# Patient Record
Sex: MALE | Race: WHITE | NOT HISPANIC OR LATINO
[De-identification: names, ages, dates, MRNs, and addresses within clinical notes are randomized per-mention and may not be internally consistent; named-entity substitution may affect disease eponyms.]

---

## 2015-05-22 RX ORDER — METOPROLOL TARTRATE 50 MG
1 TABLET ORAL
Qty: 0 | Refills: 0 | COMMUNITY
Start: 2015-05-22

## 2015-05-22 RX ORDER — ATORVASTATIN CALCIUM 80 MG/1
1 TABLET, FILM COATED ORAL
Qty: 0 | Refills: 0 | COMMUNITY
Start: 2015-05-22

## 2019-03-28 ENCOUNTER — TRANSCRIPTION ENCOUNTER (OUTPATIENT)
Age: 64
End: 2019-03-28

## 2019-03-28 ENCOUNTER — INPATIENT (INPATIENT)
Facility: HOSPITAL | Age: 64
LOS: 0 days | Discharge: HOME | DRG: 247 | End: 2019-03-29
Attending: INTERNAL MEDICINE | Admitting: INTERNAL MEDICINE
Payer: MEDICARE

## 2019-03-28 VITALS
HEART RATE: 57 BPM | OXYGEN SATURATION: 96 % | WEIGHT: 220.02 LBS | DIASTOLIC BLOOD PRESSURE: 100 MMHG | TEMPERATURE: 98 F | HEIGHT: 67 IN | SYSTOLIC BLOOD PRESSURE: 157 MMHG | RESPIRATION RATE: 16 BRPM

## 2019-03-28 DIAGNOSIS — Z98.1 ARTHRODESIS STATUS: Chronic | ICD-10-CM

## 2019-03-28 DIAGNOSIS — Z98.89 OTHER SPECIFIED POSTPROCEDURAL STATES: Chronic | ICD-10-CM

## 2019-03-28 DIAGNOSIS — I25.10 ATHEROSCLEROTIC HEART DISEASE OF NATIVE CORONARY ARTERY WITHOUT ANGINA PECTORIS: ICD-10-CM

## 2019-03-28 DIAGNOSIS — Z95.820 PERIPHERAL VASCULAR ANGIOPLASTY STATUS WITH IMPLANTS AND GRAFTS: Chronic | ICD-10-CM

## 2019-03-28 DIAGNOSIS — R94.31 ABNORMAL ELECTROCARDIOGRAM [ECG] [EKG]: ICD-10-CM

## 2019-03-28 DIAGNOSIS — Z87.19 PERSONAL HISTORY OF OTHER DISEASES OF THE DIGESTIVE SYSTEM: Chronic | ICD-10-CM

## 2019-03-28 LAB
ALBUMIN SERPL ELPH-MCNC: 4.2 G/DL — SIGNIFICANT CHANGE UP (ref 3.3–5)
ALP SERPL-CCNC: 46 U/L — SIGNIFICANT CHANGE UP (ref 40–120)
ALT FLD-CCNC: 15 U/L — SIGNIFICANT CHANGE UP (ref 10–45)
ANION GAP SERPL CALC-SCNC: 8 MMOL/L — SIGNIFICANT CHANGE UP (ref 5–17)
AST SERPL-CCNC: 16 U/L — SIGNIFICANT CHANGE UP (ref 10–40)
BILIRUB SERPL-MCNC: 0.8 MG/DL — SIGNIFICANT CHANGE UP (ref 0.2–1.2)
BUN SERPL-MCNC: 14 MG/DL — SIGNIFICANT CHANGE UP (ref 7–23)
CALCIUM SERPL-MCNC: 9.1 MG/DL — SIGNIFICANT CHANGE UP (ref 8.4–10.5)
CHLORIDE SERPL-SCNC: 104 MMOL/L — SIGNIFICANT CHANGE UP (ref 96–108)
CO2 SERPL-SCNC: 28 MMOL/L — SIGNIFICANT CHANGE UP (ref 22–31)
CREAT SERPL-MCNC: 1.01 MG/DL — SIGNIFICANT CHANGE UP (ref 0.5–1.3)
GLUCOSE SERPL-MCNC: 114 MG/DL — HIGH (ref 70–99)
HCT VFR BLD CALC: 44.1 % — SIGNIFICANT CHANGE UP (ref 39–50)
HGB BLD-MCNC: 14.8 G/DL — SIGNIFICANT CHANGE UP (ref 13–17)
MCHC RBC-ENTMCNC: 29.7 PG — SIGNIFICANT CHANGE UP (ref 27–34)
MCHC RBC-ENTMCNC: 33.5 GM/DL — SIGNIFICANT CHANGE UP (ref 32–36)
MCV RBC AUTO: 88.7 FL — SIGNIFICANT CHANGE UP (ref 80–100)
PLATELET # BLD AUTO: 164 K/UL — SIGNIFICANT CHANGE UP (ref 150–400)
POTASSIUM SERPL-MCNC: 4.2 MMOL/L — SIGNIFICANT CHANGE UP (ref 3.5–5.3)
POTASSIUM SERPL-SCNC: 4.2 MMOL/L — SIGNIFICANT CHANGE UP (ref 3.5–5.3)
PROT SERPL-MCNC: 6.5 G/DL — SIGNIFICANT CHANGE UP (ref 6–8.3)
RBC # BLD: 4.97 M/UL — SIGNIFICANT CHANGE UP (ref 4.2–5.8)
RBC # FLD: 12.6 % — SIGNIFICANT CHANGE UP (ref 10.3–14.5)
SODIUM SERPL-SCNC: 140 MMOL/L — SIGNIFICANT CHANGE UP (ref 135–145)
WBC # BLD: 6 K/UL — SIGNIFICANT CHANGE UP (ref 3.8–10.5)
WBC # FLD AUTO: 6 K/UL — SIGNIFICANT CHANGE UP (ref 3.8–10.5)

## 2019-03-28 PROCEDURE — 92928 PRQ TCAT PLMT NTRAC ST 1 LES: CPT | Mod: LC,GC

## 2019-03-28 PROCEDURE — 93458 L HRT ARTERY/VENTRICLE ANGIO: CPT | Mod: 26,59,GC

## 2019-03-28 PROCEDURE — 99152 MOD SED SAME PHYS/QHP 5/>YRS: CPT | Mod: GC

## 2019-03-28 PROCEDURE — 93571 IV DOP VEL&/PRESS C FLO 1ST: CPT | Mod: 26,LD,GC

## 2019-03-28 PROCEDURE — 93010 ELECTROCARDIOGRAM REPORT: CPT | Mod: 76

## 2019-03-28 RX ORDER — FOLIC ACID 0.8 MG
1 TABLET ORAL DAILY
Qty: 0 | Refills: 0 | Status: DISCONTINUED | OUTPATIENT
Start: 2019-03-28 | End: 2019-03-29

## 2019-03-28 RX ORDER — CLOPIDOGREL BISULFATE 75 MG/1
75 TABLET, FILM COATED ORAL DAILY
Qty: 0 | Refills: 0 | Status: DISCONTINUED | OUTPATIENT
Start: 2019-03-28 | End: 2019-03-29

## 2019-03-28 RX ORDER — TERAZOSIN HYDROCHLORIDE 10 MG/1
1 CAPSULE ORAL
Qty: 0 | Refills: 0 | COMMUNITY

## 2019-03-28 RX ORDER — HYDROMORPHONE HYDROCHLORIDE 2 MG/ML
1 INJECTION INTRAMUSCULAR; INTRAVENOUS; SUBCUTANEOUS
Qty: 0 | Refills: 0 | COMMUNITY

## 2019-03-28 RX ORDER — FOLIC ACID 0.8 MG
1 TABLET ORAL
Qty: 0 | Refills: 0 | COMMUNITY

## 2019-03-28 RX ORDER — LATANOPROST 0.05 MG/ML
1 SOLUTION/ DROPS OPHTHALMIC; TOPICAL AT BEDTIME
Qty: 0 | Refills: 0 | Status: DISCONTINUED | OUTPATIENT
Start: 2019-03-28 | End: 2019-03-29

## 2019-03-28 RX ORDER — DOXAZOSIN MESYLATE 4 MG
4 TABLET ORAL AT BEDTIME
Qty: 0 | Refills: 0 | Status: DISCONTINUED | OUTPATIENT
Start: 2019-03-28 | End: 2019-03-29

## 2019-03-28 RX ORDER — PANTOPRAZOLE SODIUM 20 MG/1
40 TABLET, DELAYED RELEASE ORAL
Qty: 0 | Refills: 0 | Status: DISCONTINUED | OUTPATIENT
Start: 2019-03-28 | End: 2019-03-29

## 2019-03-28 RX ORDER — RANOLAZINE 500 MG/1
1 TABLET, FILM COATED, EXTENDED RELEASE ORAL
Qty: 0 | Refills: 0 | COMMUNITY

## 2019-03-28 RX ORDER — HYDROMORPHONE HYDROCHLORIDE 2 MG/ML
4 INJECTION INTRAMUSCULAR; INTRAVENOUS; SUBCUTANEOUS AT BEDTIME
Qty: 0 | Refills: 0 | Status: DISCONTINUED | OUTPATIENT
Start: 2019-03-28 | End: 2019-03-29

## 2019-03-28 RX ORDER — ATORVASTATIN CALCIUM 80 MG/1
80 TABLET, FILM COATED ORAL AT BEDTIME
Qty: 0 | Refills: 0 | Status: DISCONTINUED | OUTPATIENT
Start: 2019-03-28 | End: 2019-03-29

## 2019-03-28 RX ORDER — CLOPIDOGREL BISULFATE 75 MG/1
1 TABLET, FILM COATED ORAL
Qty: 90 | Refills: 3 | OUTPATIENT
Start: 2019-03-28 | End: 2020-03-21

## 2019-03-28 RX ORDER — GABAPENTIN 400 MG/1
100 CAPSULE ORAL THREE TIMES A DAY
Qty: 0 | Refills: 0 | Status: DISCONTINUED | OUTPATIENT
Start: 2019-03-28 | End: 2019-03-29

## 2019-03-28 RX ORDER — HYDROMORPHONE HYDROCHLORIDE 2 MG/ML
4 INJECTION INTRAMUSCULAR; INTRAVENOUS; SUBCUTANEOUS EVERY 8 HOURS
Qty: 0 | Refills: 0 | Status: DISCONTINUED | OUTPATIENT
Start: 2019-03-28 | End: 2019-03-29

## 2019-03-28 RX ORDER — METOPROLOL TARTRATE 50 MG
25 TABLET ORAL DAILY
Qty: 0 | Refills: 0 | Status: DISCONTINUED | OUTPATIENT
Start: 2019-03-28 | End: 2019-03-29

## 2019-03-28 RX ORDER — RANITIDINE HYDROCHLORIDE 150 MG/1
1 TABLET, FILM COATED ORAL
Qty: 0 | Refills: 0 | COMMUNITY

## 2019-03-28 RX ORDER — RANOLAZINE 500 MG/1
500 TABLET, FILM COATED, EXTENDED RELEASE ORAL
Qty: 0 | Refills: 0 | Status: DISCONTINUED | OUTPATIENT
Start: 2019-03-28 | End: 2019-03-29

## 2019-03-28 RX ORDER — GABAPENTIN 400 MG/1
1 CAPSULE ORAL
Qty: 0 | Refills: 0 | COMMUNITY

## 2019-03-28 RX ORDER — ASPIRIN/CALCIUM CARB/MAGNESIUM 324 MG
81 TABLET ORAL DAILY
Qty: 0 | Refills: 0 | Status: DISCONTINUED | OUTPATIENT
Start: 2019-03-28 | End: 2019-03-29

## 2019-03-28 RX ORDER — FAMOTIDINE 10 MG/ML
20 INJECTION INTRAVENOUS AT BEDTIME
Qty: 0 | Refills: 0 | Status: DISCONTINUED | OUTPATIENT
Start: 2019-03-28 | End: 2019-03-29

## 2019-03-28 RX ADMIN — HYDROMORPHONE HYDROCHLORIDE 4 MILLIGRAM(S): 2 INJECTION INTRAMUSCULAR; INTRAVENOUS; SUBCUTANEOUS at 21:34

## 2019-03-28 RX ADMIN — HYDROMORPHONE HYDROCHLORIDE 4 MILLIGRAM(S): 2 INJECTION INTRAMUSCULAR; INTRAVENOUS; SUBCUTANEOUS at 22:41

## 2019-03-28 RX ADMIN — ATORVASTATIN CALCIUM 80 MILLIGRAM(S): 80 TABLET, FILM COATED ORAL at 21:34

## 2019-03-28 RX ADMIN — LATANOPROST 1 DROP(S): 0.05 SOLUTION/ DROPS OPHTHALMIC; TOPICAL at 21:35

## 2019-03-28 RX ADMIN — RANOLAZINE 500 MILLIGRAM(S): 500 TABLET, FILM COATED, EXTENDED RELEASE ORAL at 17:43

## 2019-03-28 RX ADMIN — FAMOTIDINE 20 MILLIGRAM(S): 10 INJECTION INTRAVENOUS at 21:34

## 2019-03-28 RX ADMIN — GABAPENTIN 100 MILLIGRAM(S): 400 CAPSULE ORAL at 14:52

## 2019-03-28 RX ADMIN — Medication 4 MILLIGRAM(S): at 21:34

## 2019-03-28 RX ADMIN — GABAPENTIN 100 MILLIGRAM(S): 400 CAPSULE ORAL at 21:34

## 2019-03-28 NOTE — H&P CARDIOLOGY - PSH
H/O hernia repair  (R) inguinal hernia repair  x3 last was approximately  2007  H/O shoulder surgery  1992  Lumbar disc fusion  L2L3  2009  ORIF right leg  1983

## 2019-03-28 NOTE — DISCHARGE NOTE PROVIDER - CARE PROVIDER_API CALL
Ariana Espinoza)  Internal Medicine  [address] [place] [address]  Phone: [phone]  Fax: [phone]  Follow Up Time:

## 2019-03-28 NOTE — DISCHARGE NOTE PROVIDER - HOSPITAL COURSE
HPI:    64 y/o obese  male (BMI 34) former smoker (15 pyh) with pmh of HTN, HLD, CAD s/p PCI/ANT OM3 5/21/15, Cervical spine and lumbar spinal surgery c/b Chronic pain on (hydromorphone), BPH, GERD followed by Dr. Lopez, Cardiologist reports having chest burning on exertion for the past 6 months treated on Ranitidine and Protonix worsening over the past few weeks unable to tolerate Nitro patch s/p NST revealing  a small miled infero-lateral reversible defect consistent with ischemia which can be due to OM stent stenosis LVEF 61% (3/12/19).  Pt presents for cardiac cath for further evaluation of his CAD based on his symptoms and abnormal Stress test.  Pt denies chest burning or pain, palpitations or SOB.  Pt does have difficulty sitting still 2/2 his chronic back pain.          PCP - Dr. Espinoza (28 Mar 2019 08:04)        3/28 cardiac cath with one stent to the mid LAD. Right radial site without swelling, bleeding.

## 2019-03-28 NOTE — DISCHARGE NOTE PROVIDER - NSDCCPCAREPLAN_GEN_ALL_CORE_FT
PRINCIPAL DISCHARGE DIAGNOSIS  Diagnosis: CAD (coronary artery disease), native coronary artery  Assessment and Plan of Treatment: Do not stop your aspirin or Plavix unless instructed to do so by your cardiologist, they help keep your stented arteries open. No heavy lifting or pushing/pulling with procedure arm for 2 weeks. No driving for 2 days. You may shower 24 hours following the procedure but avoid baths/swimming for 1 week. Check your wrist site for bleeding and/or swelling daily following procedure and call your doctor immediately if it occurs or if you experience increased pain at the site. Follow up with your cardiologist in 1-2 weeks. You may call [facility] if you have any questions/concerns regarding your procedure [phone].      SECONDARY DISCHARGE DIAGNOSES  Diagnosis: HLD (hyperlipidemia)  Assessment and Plan of Treatment: Continue with your cholesterol medications. Eat a heart healthy diet that is low in saturated fats and salt, and includes whole grains, fruits, vegetables and lean protein; exercise regularly (consult with your physician or cardiologist first); maintain a heart healthy weight; if you smoke - quit (A resource to help you stop smoking is the [facility] for Tobacco Control – phone number [phone].). Continue to follow with your primary physician or cardiologist.    Diagnosis: HTN (hypertension)  Assessment and Plan of Treatment: Continue with your blood pressure medications; eat a heart healthy diet with low salt diet; exercise regularly (consult with your physician or cardiologist first); maintain a heart healthy weight; if you smoke - quit (A resource to help you stop smoking is the [facility] [employer] for Tobacco Control – phone number [phone].); include healthy ways to manage stress. Continue to follow with your primary care physician or cardiologist.

## 2019-03-28 NOTE — DISCHARGE NOTE PROVIDER - NSDCCPTREATMENT_GEN_ALL_CORE_FT
PRINCIPAL PROCEDURE  Procedure: Left heart cardiac cath  Findings and Treatment: one stent to the mid LAD

## 2019-03-28 NOTE — DISCHARGE NOTE PROVIDER - NSDCPNSUBOBJ_GEN_ALL_CORE
Patient is a 63y old  Male who presents with a chief complaint of abnormal stress test (28 Mar 2019 21:34)                    Allergies        No Known Allergies        Intolerances                Medications:    aspirin enteric coated 81 milliGRAM(s) Oral daily    atorvastatin 80 milliGRAM(s) Oral at bedtime    clopidogrel Tablet 75 milliGRAM(s) Oral daily    doxazosin 4 milliGRAM(s) Oral at bedtime    famotidine    Tablet 20 milliGRAM(s) Oral at bedtime    folic acid 1 milliGRAM(s) Oral daily    gabapentin 100 milliGRAM(s) Oral three times a day    HYDROmorphone   Tablet 4 milliGRAM(s) Oral every 8 hours PRN    HYDROmorphone   Tablet 4 milliGRAM(s) Oral at bedtime PRN    latanoprost 0.005% Ophthalmic Solution 1 Drop(s) Both EYES at bedtime    metoprolol succinate ER 25 milliGRAM(s) Oral daily    pantoprazole    Tablet 40 milliGRAM(s) Oral before breakfast    ranolazine 500 milliGRAM(s) Oral two times a day            Vitals:    T(C): 36.8 (19 @ 21:01), Max: 36.9 (19 @ 12:25)    HR: 53 (19 @ 21:01) (44 - 57)    BP: 143/72 (19 @ 21:01) (111/81 - 160/83)    BP(mean): 119 (19 @ 08:04) (119 - 119)    RR: 16 (19 @ 21:01) (16 - 18)    SpO2: 98% (19 @ 21:01) (96% - 98%)    Wt(kg): --    Daily Height in cm: 170.18 (28 Mar 2019 08:04)      Daily Weight in k.8 (28 Mar 2019 08:04)    I&O's Summary                Physical Exam:    Appearance: Normal    Eyes: PERRL, EOMI    HENT: Normal oral muscosa, NC/AT    Cardiovascular: S1S2, RRR, No M/R/G, no JVD, No Lower extremity edema    Procedural Access Site: No hematoma, Non-tender to palpation, 2+ pulse, No bruit, No Ecchymosis    Respiratory: Clear to auscultation bilaterally    Gastrointestinal: Soft, Non tender, Normal Bowel Sounds    Musculoskeletal: No clubbing, No joint deformity     Neurologic: Non-focal    Lymphatic: No lymphadenopathy    Psychiatry: AAOx3, Mood & affect appropriate    Skin: No rashes, No ecchymoses, No cyanosis                141  |  104  |  15    ----------------------------<  93    3.9   |  27  |  1.00        Ca    9.0      29 Mar 2019 02:08        TPro  6.4  /  Alb  3.9  /  TBili  1.0  /  DBili  x   /  AST  16  /  ALT  19  /  AlkPhos  42                          Lipid panel     Hgb A1c                             14.5     7.2   )-----------( 157      ( 29 Mar 2019 02:08 )               43.5                 ECG: SB 56 bpm        Cath: one mid LAD stent        Imaging:        Interpretation of Telemetry:

## 2019-03-28 NOTE — H&P CARDIOLOGY - HISTORY OF PRESENT ILLNESS
62 y/o obese  male (BMI 34) former smoker (15 pyh) with pmh of HTN, HLD, CAD s/p PCI/ANT OM3 5/21/15, Cervical spine and lumbar spinal surgery c/b Chronic pain on (hydromorphone), BPH, GERD followed by Dr. Lopez, Cardiologist reports having chest burning on exertion for the past 6 months treated on Ranitidine and Protonix worsening over the past few weeks unable to tolerate Nitro patch s/p NST revealing  a small miled infero-lateral reversible defect consistent with ischemia which can be due to OM stent stenosis LVEF 61% (3/12/19).  Pt presents for cardiac cath for further evaluation of his CAD based on his symptoms and abnormal Stress test.  Pt denies chest burning or pain, palpitations or SOB.  Pt does have difficulty sitting still 2/2 his chronic back pain.      PCP - Dr. Espinoza

## 2019-03-28 NOTE — CHART NOTE - NSCHARTNOTEFT_GEN_A_CORE
Patient underwent a PCI procedure and is being admitted as they are at increased risk for major adverse cardiac and vascular events if discharged due to the following high risk characteristics:      Unstable angina    Marlon Elias, NP  x [phone]

## 2019-03-29 ENCOUNTER — TRANSCRIPTION ENCOUNTER (OUTPATIENT)
Age: 64
End: 2019-03-29

## 2019-03-29 VITALS
DIASTOLIC BLOOD PRESSURE: 86 MMHG | OXYGEN SATURATION: 97 % | RESPIRATION RATE: 17 BRPM | HEART RATE: 63 BPM | TEMPERATURE: 98 F | SYSTOLIC BLOOD PRESSURE: 155 MMHG

## 2019-03-29 DIAGNOSIS — I25.118 ATHEROSCLEROTIC HEART DISEASE OF NATIVE CORONARY ARTERY WITH OTHER FORMS OF ANGINA PECTORIS: ICD-10-CM

## 2019-03-29 DIAGNOSIS — I10 ESSENTIAL (PRIMARY) HYPERTENSION: ICD-10-CM

## 2019-03-29 DIAGNOSIS — E78.5 HYPERLIPIDEMIA, UNSPECIFIED: ICD-10-CM

## 2019-03-29 LAB
ALBUMIN SERPL ELPH-MCNC: 3.9 G/DL — SIGNIFICANT CHANGE UP (ref 3.3–5)
ALP SERPL-CCNC: 42 U/L — SIGNIFICANT CHANGE UP (ref 40–120)
ALT FLD-CCNC: 19 U/L — SIGNIFICANT CHANGE UP (ref 10–45)
ANION GAP SERPL CALC-SCNC: 10 MMOL/L — SIGNIFICANT CHANGE UP (ref 5–17)
AST SERPL-CCNC: 16 U/L — SIGNIFICANT CHANGE UP (ref 10–40)
BASOPHILS # BLD AUTO: 0 K/UL — SIGNIFICANT CHANGE UP (ref 0–0.2)
BASOPHILS NFR BLD AUTO: 0.6 % — SIGNIFICANT CHANGE UP (ref 0–2)
BILIRUB SERPL-MCNC: 1 MG/DL — SIGNIFICANT CHANGE UP (ref 0.2–1.2)
BUN SERPL-MCNC: 15 MG/DL — SIGNIFICANT CHANGE UP (ref 7–23)
CALCIUM SERPL-MCNC: 9 MG/DL — SIGNIFICANT CHANGE UP (ref 8.4–10.5)
CHLORIDE SERPL-SCNC: 104 MMOL/L — SIGNIFICANT CHANGE UP (ref 96–108)
CO2 SERPL-SCNC: 27 MMOL/L — SIGNIFICANT CHANGE UP (ref 22–31)
CREAT SERPL-MCNC: 1 MG/DL — SIGNIFICANT CHANGE UP (ref 0.5–1.3)
EOSINOPHIL # BLD AUTO: 0.2 K/UL — SIGNIFICANT CHANGE UP (ref 0–0.5)
EOSINOPHIL NFR BLD AUTO: 2.8 % — SIGNIFICANT CHANGE UP (ref 0–6)
GLUCOSE SERPL-MCNC: 93 MG/DL — SIGNIFICANT CHANGE UP (ref 70–99)
HCT VFR BLD CALC: 43.5 % — SIGNIFICANT CHANGE UP (ref 39–50)
HGB BLD-MCNC: 14.5 G/DL — SIGNIFICANT CHANGE UP (ref 13–17)
LYMPHOCYTES # BLD AUTO: 1.8 K/UL — SIGNIFICANT CHANGE UP (ref 1–3.3)
LYMPHOCYTES # BLD AUTO: 25.1 % — SIGNIFICANT CHANGE UP (ref 13–44)
MCHC RBC-ENTMCNC: 29.5 PG — SIGNIFICANT CHANGE UP (ref 27–34)
MCHC RBC-ENTMCNC: 33.3 GM/DL — SIGNIFICANT CHANGE UP (ref 32–36)
MCV RBC AUTO: 88.4 FL — SIGNIFICANT CHANGE UP (ref 80–100)
MONOCYTES # BLD AUTO: 0.6 K/UL — SIGNIFICANT CHANGE UP (ref 0–0.9)
MONOCYTES NFR BLD AUTO: 8 % — SIGNIFICANT CHANGE UP (ref 2–14)
NEUTROPHILS # BLD AUTO: 4.6 K/UL — SIGNIFICANT CHANGE UP (ref 1.8–7.4)
NEUTROPHILS NFR BLD AUTO: 63.5 % — SIGNIFICANT CHANGE UP (ref 43–77)
PLATELET # BLD AUTO: 157 K/UL — SIGNIFICANT CHANGE UP (ref 150–400)
POTASSIUM SERPL-MCNC: 3.9 MMOL/L — SIGNIFICANT CHANGE UP (ref 3.5–5.3)
POTASSIUM SERPL-SCNC: 3.9 MMOL/L — SIGNIFICANT CHANGE UP (ref 3.5–5.3)
PROT SERPL-MCNC: 6.4 G/DL — SIGNIFICANT CHANGE UP (ref 6–8.3)
RBC # BLD: 4.92 M/UL — SIGNIFICANT CHANGE UP (ref 4.2–5.8)
RBC # FLD: 12.4 % — SIGNIFICANT CHANGE UP (ref 10.3–14.5)
SODIUM SERPL-SCNC: 141 MMOL/L — SIGNIFICANT CHANGE UP (ref 135–145)
WBC # BLD: 7.2 K/UL — SIGNIFICANT CHANGE UP (ref 3.8–10.5)
WBC # FLD AUTO: 7.2 K/UL — SIGNIFICANT CHANGE UP (ref 3.8–10.5)

## 2019-03-29 PROCEDURE — 99231 SBSQ HOSP IP/OBS SF/LOW 25: CPT

## 2019-03-29 PROCEDURE — 99152 MOD SED SAME PHYS/QHP 5/>YRS: CPT

## 2019-03-29 PROCEDURE — 99153 MOD SED SAME PHYS/QHP EA: CPT

## 2019-03-29 PROCEDURE — C1874: CPT

## 2019-03-29 PROCEDURE — 93458 L HRT ARTERY/VENTRICLE ANGIO: CPT | Mod: 59

## 2019-03-29 PROCEDURE — C1894: CPT

## 2019-03-29 PROCEDURE — C1725: CPT

## 2019-03-29 PROCEDURE — 93005 ELECTROCARDIOGRAM TRACING: CPT

## 2019-03-29 PROCEDURE — 80053 COMPREHEN METABOLIC PANEL: CPT

## 2019-03-29 PROCEDURE — 85027 COMPLETE CBC AUTOMATED: CPT

## 2019-03-29 PROCEDURE — C1887: CPT

## 2019-03-29 PROCEDURE — C9600: CPT | Mod: LD

## 2019-03-29 PROCEDURE — 93571 IV DOP VEL&/PRESS C FLO 1ST: CPT | Mod: LD

## 2019-03-29 PROCEDURE — C1769: CPT

## 2019-03-29 RX ORDER — PANTOPRAZOLE SODIUM 20 MG/1
1 TABLET, DELAYED RELEASE ORAL
Qty: 30 | Refills: 0 | OUTPATIENT
Start: 2019-03-29 | End: 2019-04-27

## 2019-03-29 RX ADMIN — GABAPENTIN 100 MILLIGRAM(S): 400 CAPSULE ORAL at 06:05

## 2019-03-29 RX ADMIN — CLOPIDOGREL BISULFATE 75 MILLIGRAM(S): 75 TABLET, FILM COATED ORAL at 06:05

## 2019-03-29 RX ADMIN — Medication 81 MILLIGRAM(S): at 06:05

## 2019-03-29 RX ADMIN — Medication 25 MILLIGRAM(S): at 06:05

## 2019-03-29 RX ADMIN — RANOLAZINE 500 MILLIGRAM(S): 500 TABLET, FILM COATED, EXTENDED RELEASE ORAL at 06:04

## 2019-03-29 RX ADMIN — PANTOPRAZOLE SODIUM 40 MILLIGRAM(S): 20 TABLET, DELAYED RELEASE ORAL at 06:05

## 2019-03-29 NOTE — PROGRESS NOTE ADULT - ASSESSMENT
HPI:  62 y/o obese  male (BMI 34) former smoker (15 pyh) with pmh of HTN, HLD, CAD s/p PCI/ANT OM3 5/21/15, Cervical spine and lumbar spinal surgery c/b Chronic pain on (hydromorphone), BPH, GERD followed by Dr. Lopez, Cardiologist reports having chest burning on exertion for the past 6 months treated on Ranitidine and Protonix worsening over the past few weeks unable to tolerate Nitro patch s/p NST revealing  a small miled infero-lateral reversible defect consistent with ischemia which can be due to OM stent stenosis LVEF 61% (3/12/19).  Pt presents for cardiac cath for further evaluation of his CAD based on his symptoms and abnormal Stress test.  Pt denies chest burning or pain, palpitations or SOB.  Pt does have difficulty sitting still 2/2 his chronic back pain.      PCP - Dr. Espinoza (28 Mar 2019 08:04)

## 2019-03-29 NOTE — DISCHARGE NOTE NURSING/CASE MANAGEMENT/SOCIAL WORK - NSDCDPATPORTLINK_GEN_ALL_CORE
You can access the [facility] Patient Portal, offered by [facility], by registering with the following website: [email or web address]

## 2019-03-29 NOTE — PROGRESS NOTE ADULT - SUBJECTIVE AND OBJECTIVE BOX
Patient is a 63y old  Male who presents with a chief complaint of abnormal stress test (28 Mar 2019 21:34)          Allergies    No Known Allergies    Intolerances        Medications:  aspirin enteric coated 81 milliGRAM(s) Oral daily  atorvastatin 80 milliGRAM(s) Oral at bedtime  clopidogrel Tablet 75 milliGRAM(s) Oral daily  doxazosin 4 milliGRAM(s) Oral at bedtime  famotidine    Tablet 20 milliGRAM(s) Oral at bedtime  folic acid 1 milliGRAM(s) Oral daily  gabapentin 100 milliGRAM(s) Oral three times a day  HYDROmorphone   Tablet 4 milliGRAM(s) Oral every 8 hours PRN  HYDROmorphone   Tablet 4 milliGRAM(s) Oral at bedtime PRN  latanoprost 0.005% Ophthalmic Solution 1 Drop(s) Both EYES at bedtime  metoprolol succinate ER 25 milliGRAM(s) Oral daily  pantoprazole    Tablet 40 milliGRAM(s) Oral before breakfast  ranolazine 500 milliGRAM(s) Oral two times a day      Vitals:  T(C): 36.8 (19 @ 21:01), Max: 36.9 (19 @ 12:25)  HR: 53 (19 @ 21:01) (44 - 57)  BP: 143/72 (19 @ 21:01) (111/81 - 160/83)  BP(mean): 119 (19 @ 08:04) (119 - 119)  RR: 16 (19 @ 21:01) (16 - 18)  SpO2: 98% (19 @ 21:01) (96% - 98%)  Wt(kg): --  Daily Height in cm: 170.18 (28 Mar 2019 08:04)    Daily Weight in k.8 (28 Mar 2019 08:04)  I&O's Summary        Physical Exam:  Appearance: Normal  Eyes: PERRL, EOMI  HENT: Normal oral muscosa, NC/AT  Cardiovascular: S1S2, RRR, No M/R/G, no JVD, No Lower extremity edema  Procedural Access Site: No hematoma, Non-tender to palpation, 2+ pulse, No bruit, No Ecchymosis  Respiratory: Clear to auscultation bilaterally  Gastrointestinal: Soft, Non tender, Normal Bowel Sounds  Musculoskeletal: No clubbing, No joint deformity   Neurologic: Non-focal  Lymphatic: No lymphadenopathy  Psychiatry: AAOx3, Mood & affect appropriate  Skin: No rashes, No ecchymoses, No cyanosis        141  |  104  |  15  ----------------------------<  93  3.9   |  27  |  1.00    Ca    9.0      29 Mar 2019 02:08    TPro  6.4  /  Alb  3.9  /  TBili  1.0  /  DBili  x   /  AST  16  /  ALT  19  /  AlkPhos  42              Lipid panel   Hgb A1c                         14.5   7.2   )-----------( 157      ( 29 Mar 2019 02:08 )             43.5         ECG: SB 53 bpm    Cath: mid LAD x1 stent    Imaging:    Interpretation of Telemetry:

## 2022-08-09 PROBLEM — I25.10 ATHEROSCLEROTIC HEART DISEASE OF NATIVE CORONARY ARTERY WITHOUT ANGINA PECTORIS: Chronic | Status: ACTIVE | Noted: 2019-03-28

## 2022-08-11 ENCOUNTER — APPOINTMENT (OUTPATIENT)
Dept: SPINE | Facility: CLINIC | Age: 67
End: 2022-08-11

## 2022-08-11 VITALS
WEIGHT: 207 LBS | SYSTOLIC BLOOD PRESSURE: 101 MMHG | DIASTOLIC BLOOD PRESSURE: 69 MMHG | HEART RATE: 68 BPM | OXYGEN SATURATION: 96 % | RESPIRATION RATE: 16 BRPM | HEIGHT: 67 IN | BODY MASS INDEX: 32.49 KG/M2 | TEMPERATURE: 97.7 F

## 2022-08-11 PROCEDURE — 99203 OFFICE O/P NEW LOW 30 MIN: CPT

## 2022-08-11 NOTE — HISTORY OF PRESENT ILLNESS
[Other: ___] : [unfilled] [FreeTextEntry1] : The patient complains of mid and lower  back pain with radiation to his left lateral and anterior calf region [de-identified] : ZIYAD DUNCAN is a 67 year old gentleman who underwent a C4-C7 anterior cervical discectomy and fusion on 04/26/2012 with Dr. Greenberg.  He also underwent an L2-L3 lumbar fusion at the [facility] by another provider in 2009.  The patient had seen Dr. Greenberg in 2013 with complaints of pain radiating into his groin and had an MRI of the thoracic spine which revealed stenosis on the right side at T10-T11 and T11-T12.  It was recommended that the patient try conservative measures of pain management.  He stated that he has been experiencing mid and lower back pain with radiation mostly into his lateral thigh and anterior calf for over a year.  He did receive physical therapy and acupuncture a year ago with little relief.  He has not received any epidural injections.  The patient stated his pain is 6-7 on a scale from 0-10 and bothers him every day.  The pain is worse at night.  He also suffers pain from a right leg fracture which occurred years ago.  The patient denies any difficulty with bowel or bladder except for urinary frequency due to BPH.

## 2022-08-11 NOTE — ASSESSMENT
[FreeTextEntry1] : it was recommended that the patient start physical therapy to help to relieve his mid and lower back and left leg pain.  He was also provided with a referral to pain management specialist, Dr. Attila Gerardo  for consideration of lumbar epidural injections.  The patient is to have thoracic AP and lateral and lumbar AP and lateral x rays with flexion and extension views.  It was also advised that he have MRIs of the thoracic and lumbar spine for evaluation of thoracic and lumbar stenosis.  He is to return to the office with the images for Dr. Scott Greenberg to review.

## 2022-08-11 NOTE — PHYSICAL EXAM
[General Appearance - Alert] : alert [General Appearance - In No Acute Distress] : in no acute distress [General Appearance - Well Nourished] : well nourished [General Appearance - Well Developed] : well developed [] : normal voice and communication [General Appearance - Well-Appearing] : healthy appearing [Person] : oriented to person [Place] : oriented to place [Short Term Intact] : short term memory intact [Time] : oriented to time [Remote Intact] : remote memory intact [Span Intact] : the attention span was normal [Concentration Intact] : normal concentrating ability [Fluency] : fluency intact [Comprehension] : comprehension intact [Current Events] : adequate knowledge of current events [Past History] : adequate knowledge of personal past history [Vocabulary] : adequate range of vocabulary [Cranial Nerves Optic (II)] : visual acuity intact bilaterally,  pupils equal round and reactive to light [Cranial Nerves Oculomotor (III)] : extraocular motion intact [Cranial Nerves Trigeminal (V)] : facial sensation intact symmetrically [Cranial Nerves Facial (VII)] : face symmetrical [Cranial Nerves Vestibulocochlear (VIII)] : hearing was intact bilaterally [Cranial Nerves Glossopharyngeal (IX)] : tongue and palate midline [Cranial Nerves Accessory (XI - Cranial And Spinal)] : head turning and shoulder shrug symmetric [Cranial Nerves Hypoglossal (XII)] : there was no tongue deviation with protrusion [Motor Tone] : muscle tone was normal in all four extremities [Motor Strength] : muscle strength was normal in all four extremities [No Muscle Atrophy] : normal bulk in all four extremities [4] : L2 Iliopsoas 4/5 [5] : S1 flexor hallucis longus 5/5 [Sensation Tactile Decrease] : light touch was intact [Limited Balance] : the patient's balance was impaired [0] : Ankle jerk right 0 [2+] : Ankle jerk left 2+ [Past-pointing] : there was no past-pointing [Tremor] : no tremor present [Plantar Reflex Right Only] : normal on the right [Plantar Reflex Left Only] : normal on the left [___] : absent on the right [___] : absent on the left [Duarte] : Duarte's sign was not demonstrated [FreeTextEntry6] : The patient keeps his right leg out straight due to a history of a fracture. [FreeTextEntry1] : The patient is able to hold his right leg out straight but has little motion at his knee or ankle.  His left hip flexor strength is pain limited. [FreeTextEntry8] : Walks with a limp using a cane due to history of right leg fracture.  Ambulates with a slightly bent over posture.

## 2022-08-11 NOTE — REASON FOR VISIT
[New Patient Visit] : a new patient visit [FreeTextEntry1] : The patient reports having mid and lower back pain with radiation down his left leg to his lateral and anterior left calf region.

## 2022-08-11 NOTE — REVIEW OF SYSTEMS
[Feeling Tired] : feeling tired [Leg Weakness] : leg weakness [Difficulty Walking] : difficulty walking [Limping] : limping [As Noted in HPI] : as noted in HPI [Negative] : Heme/Lymph [FreeTextEntry8] : urinary frequency due to BPH

## 2022-09-13 ENCOUNTER — OUTPATIENT (OUTPATIENT)
Dept: OUTPATIENT SERVICES | Facility: HOSPITAL | Age: 67
LOS: 1 days | End: 2022-09-13
Payer: MEDICARE

## 2022-09-13 ENCOUNTER — APPOINTMENT (OUTPATIENT)
Dept: ANESTHESIOLOGY | Facility: CLINIC | Age: 67
End: 2022-09-13

## 2022-09-13 DIAGNOSIS — M54.17 RADICULOPATHY, LUMBOSACRAL REGION: ICD-10-CM

## 2022-09-13 DIAGNOSIS — Z95.820 PERIPHERAL VASCULAR ANGIOPLASTY STATUS WITH IMPLANTS AND GRAFTS: Chronic | ICD-10-CM

## 2022-09-13 DIAGNOSIS — Z98.89 OTHER SPECIFIED POSTPROCEDURAL STATES: Chronic | ICD-10-CM

## 2022-09-13 DIAGNOSIS — Z98.1 ARTHRODESIS STATUS: Chronic | ICD-10-CM

## 2022-09-13 DIAGNOSIS — Z87.19 PERSONAL HISTORY OF OTHER DISEASES OF THE DIGESTIVE SYSTEM: Chronic | ICD-10-CM

## 2022-09-13 PROCEDURE — 64483 NJX AA&/STRD TFRM EPI L/S 1: CPT

## 2022-09-14 ENCOUNTER — APPOINTMENT (OUTPATIENT)
Dept: SPINE | Facility: CLINIC | Age: 67
End: 2022-09-14

## 2022-09-14 VITALS
BODY MASS INDEX: 32.49 KG/M2 | HEIGHT: 67 IN | SYSTOLIC BLOOD PRESSURE: 130 MMHG | OXYGEN SATURATION: 97 % | DIASTOLIC BLOOD PRESSURE: 77 MMHG | HEART RATE: 53 BPM | WEIGHT: 207 LBS

## 2022-09-14 PROCEDURE — 99213 OFFICE O/P EST LOW 20 MIN: CPT

## 2022-10-07 ENCOUNTER — OUTPATIENT (OUTPATIENT)
Dept: OUTPATIENT SERVICES | Facility: HOSPITAL | Age: 67
LOS: 1 days | End: 2022-10-07
Payer: MEDICARE

## 2022-10-07 ENCOUNTER — APPOINTMENT (OUTPATIENT)
Dept: ANESTHESIOLOGY | Facility: CLINIC | Age: 67
End: 2022-10-07

## 2022-10-07 DIAGNOSIS — Z98.1 ARTHRODESIS STATUS: Chronic | ICD-10-CM

## 2022-10-07 DIAGNOSIS — M54.17 RADICULOPATHY, LUMBOSACRAL REGION: ICD-10-CM

## 2022-10-07 DIAGNOSIS — M54.16 RADICULOPATHY, LUMBAR REGION: ICD-10-CM

## 2022-10-07 DIAGNOSIS — Z87.19 PERSONAL HISTORY OF OTHER DISEASES OF THE DIGESTIVE SYSTEM: Chronic | ICD-10-CM

## 2022-10-07 DIAGNOSIS — Z95.820 PERIPHERAL VASCULAR ANGIOPLASTY STATUS WITH IMPLANTS AND GRAFTS: Chronic | ICD-10-CM

## 2022-10-07 DIAGNOSIS — Z98.89 OTHER SPECIFIED POSTPROCEDURAL STATES: Chronic | ICD-10-CM

## 2022-10-07 PROCEDURE — 62323 NJX INTERLAMINAR LMBR/SAC: CPT

## 2022-10-26 ENCOUNTER — NON-APPOINTMENT (OUTPATIENT)
Age: 67
End: 2022-10-26

## 2022-10-26 ENCOUNTER — APPOINTMENT (OUTPATIENT)
Dept: SPINE | Facility: CLINIC | Age: 67
End: 2022-10-26

## 2022-10-26 VITALS
SYSTOLIC BLOOD PRESSURE: 147 MMHG | HEIGHT: 67 IN | WEIGHT: 210 LBS | HEART RATE: 79 BPM | DIASTOLIC BLOOD PRESSURE: 83 MMHG | BODY MASS INDEX: 32.96 KG/M2 | OXYGEN SATURATION: 95 %

## 2022-10-26 PROCEDURE — 99213 OFFICE O/P EST LOW 20 MIN: CPT

## 2022-10-27 ENCOUNTER — OUTPATIENT (OUTPATIENT)
Dept: OUTPATIENT SERVICES | Facility: HOSPITAL | Age: 67
LOS: 1 days | End: 2022-10-27
Payer: MEDICARE

## 2022-10-27 ENCOUNTER — APPOINTMENT (OUTPATIENT)
Dept: RADIOLOGY | Facility: CLINIC | Age: 67
End: 2022-10-27

## 2022-10-27 DIAGNOSIS — Z98.89 OTHER SPECIFIED POSTPROCEDURAL STATES: Chronic | ICD-10-CM

## 2022-10-27 DIAGNOSIS — Z00.00 ENCOUNTER FOR GENERAL ADULT MEDICAL EXAMINATION WITHOUT ABNORMAL FINDINGS: ICD-10-CM

## 2022-10-27 DIAGNOSIS — Z98.1 ARTHRODESIS STATUS: Chronic | ICD-10-CM

## 2022-10-27 DIAGNOSIS — Z95.820 PERIPHERAL VASCULAR ANGIOPLASTY STATUS WITH IMPLANTS AND GRAFTS: Chronic | ICD-10-CM

## 2022-10-27 DIAGNOSIS — Z87.19 PERSONAL HISTORY OF OTHER DISEASES OF THE DIGESTIVE SYSTEM: Chronic | ICD-10-CM

## 2022-10-27 PROCEDURE — 72083 X-RAY EXAM ENTIRE SPI 4/5 VW: CPT | Mod: 26

## 2022-10-27 PROCEDURE — 72083 X-RAY EXAM ENTIRE SPI 4/5 VW: CPT

## 2022-10-27 NOTE — RESULTS/DATA
[FreeTextEntry1] : MRI Thoracic spine:  [id number] is a moderate sized right paracentral to the right foraminal zone dis protrusion which results in moderate overall central canal stenosis with mass effect on the ventral aspect of the right hemicord without abnormal cord signal.\par \par MRI lumbar spine:  There is evidence of the L2-L3 instrumented and interbody fusion.  There is stenosis with central and left lateral recess and foraminal stenosis at L3-L4 and on the right lateral recess and foraminal stenosis at L4-L5 with facet hypertrophy at each of those levels.

## 2022-10-27 NOTE — DATA REVIEWED
[de-identified] : MRI of the thoracic and lumbar spine spine without contrast at [facility] done on 08/15/2022 and 08/11/2022 respectively.

## 2022-10-27 NOTE — CONSULT LETTER
[Dear  ___] : Dear  [unfilled], [Courtesy Letter:] : I had the pleasure of seeing your patient, [unfilled], in my office today. [Please see my note below.] : Please see my note below. [Consult Closing:] : Thank you very much for allowing me to participate in the care of this patient.  If you have any questions, please do not hesitate to contact me. [Sincerely,] : Sincerely, [FreeTextEntry2] : Jonna Espinoza M.D.\par [address] [facility] \[place]  [address] [FreeTextEntry1] : Jun Mccoy\par  1955 [FreeTextEntry3] : Scott Greenberg M.D.

## 2022-10-27 NOTE — ADDENDUM
[FreeTextEntry1] : I, Dr. Scott Greenberg M.D., evaluated this patient with the Nurse practitioner, Liz Shipley N.P., and established the plan of care.  I personally discussed this patient during the key portions of the history and exam with the Nurse Practitioner at the time of the visit.  I agree with the assessment and plan as written, unless noted below.\par

## 2022-10-27 NOTE — PHYSICAL EXAM
[General Appearance - Alert] : alert [General Appearance - In No Acute Distress] : in no acute distress [General Appearance - Well Nourished] : well nourished [General Appearance - Well Developed] : well developed [General Appearance - Well-Appearing] : healthy appearing [] : normal voice and communication [Person] : oriented to person [Place] : oriented to place [Time] : oriented to time [Short Term Intact] : short term memory intact [Remote Intact] : remote memory intact [Span Intact] : the attention span was normal [Concentration Intact] : normal concentrating ability [Fluency] : fluency intact [Comprehension] : comprehension intact [Current Events] : adequate knowledge of current events [Past History] : adequate knowledge of personal past history [Vocabulary] : adequate range of vocabulary [Cranial Nerves Optic (II)] : visual acuity intact bilaterally,  pupils equal round and reactive to light [Cranial Nerves Oculomotor (III)] : extraocular motion intact [Cranial Nerves Trigeminal (V)] : facial sensation intact symmetrically [Cranial Nerves Facial (VII)] : face symmetrical [Cranial Nerves Vestibulocochlear (VIII)] : hearing was intact bilaterally [Cranial Nerves Glossopharyngeal (IX)] : tongue and palate midline [Cranial Nerves Accessory (XI - Cranial And Spinal)] : head turning and shoulder shrug symmetric [Cranial Nerves Hypoglossal (XII)] : there was no tongue deviation with protrusion [Motor Tone] : muscle tone was normal in all four extremities [Motor Strength] : muscle strength was normal in all four extremities [No Muscle Atrophy] : normal bulk in all four extremities [5] : S1 flexor hallucis longus 5/5 [Sensation Tactile Decrease] : light touch was intact [Limited Balance] : the patient's balance was impaired [0] : Ankle jerk right 0 [2+] : Ankle jerk left 2+ [4] : C5 Biceps 4/5 [Past-pointing] : there was no past-pointing [Tremor] : no tremor present [FreeTextEntry1] : Some restricted movement of his right shoulder due to an old injury.  Limited ROM of left knee.  Has small amount ability to dorsiflex and plantar flex right ankle. [FreeTextEntry8] : The patient has a history of an old injury to his right leg and can not bend it at the knee

## 2022-10-27 NOTE — ASSESSMENT
[FreeTextEntry1] : These images were again reviewed and discussed with the patient.  He has evidence of thoracic and lumbar spine stenosis and his pain has not been relieved with conservative measures of pain management.  It was discussed that if he wants to pursue a surgical intervention, he would need an extensive surgery which would involve decompression and fusion to  include the T11-T12 level and extending down at least to L4-L5 to properly decompress the nerves.  Mr. Mccoy was referred to my colleague who Jonathan Bradford, who has a particular expertise and interest in this type of extensive spine surgery.  He may return to my office on an as needed basis.

## 2022-10-27 NOTE — REASON FOR VISIT
[Follow-Up: _____] : a [unfilled] follow-up visit [FreeTextEntry1] : ZIYAD DUNCAN is a 67 year old gentleman who underwent a C4-C5, C5-C6, and C6-C7 anterior discectomy and interbody fusion on 04/26/2012 done by myself.  He also underwent an L2-L3 lumbar fusion at the [facility] by another provider on 2009.  He had been doing well but has been having fairly new pain in his lower back radiating to his lower extremities into his thighs and lower legs for the past year which is worse on the left side. He denies any weakness or bowel or bladder problems.   The patient has been doing physical therapy with little relief.  He is followed by pain specialist, Dr. Tim Yeager and has received 3 epidural injections without significant relief.  He returns to the office for a neurosurgical consultation.

## 2022-10-31 ENCOUNTER — APPOINTMENT (OUTPATIENT)
Dept: NEUROSURGERY | Facility: CLINIC | Age: 67
End: 2022-10-31

## 2022-10-31 VITALS
HEART RATE: 85 BPM | TEMPERATURE: 97.7 F | BODY MASS INDEX: 32.96 KG/M2 | SYSTOLIC BLOOD PRESSURE: 105 MMHG | HEIGHT: 67 IN | WEIGHT: 210 LBS | OXYGEN SATURATION: 97 % | DIASTOLIC BLOOD PRESSURE: 66 MMHG

## 2022-10-31 VITALS — OXYGEN SATURATION: 97 %

## 2022-10-31 DIAGNOSIS — M54.6 PAIN IN THORACIC SPINE: ICD-10-CM

## 2022-10-31 PROCEDURE — 99215 OFFICE O/P EST HI 40 MIN: CPT

## 2022-11-02 NOTE — RESULTS/DATA
[FreeTextEntry1] : MRI Thoracic spine:  [id number] is a moderate sized right paracentral to the right foraminal zone dis protrusion which results in moderate overall central canal stenosis with mass effect on the ventral aspect of the right hemicord without abnormal cord signal.\par \par MRI lumbar spine:  There is evidence of the L2-L3 instrumented and interbody fusion.  There is stenosis with central and left lateral recess and foraminal stenosis at L3-L4 and on the right lateral recess and foraminal stenosis at L4-L5 with facet hypertrophy at each of those levels.

## 2022-11-02 NOTE — ADDENDUM
[FreeTextEntry1] : I have personally seen , performed an exam and reviewed his imaging with him on PACS and a spine model. 68 yo M s/p L2-3 fusion (2009) with Left L3-4 and bilateral L4-5 stenosis and kyphotic deformity of the lumbar spine, along with multi level thoracic disc herniations T10-12 worse on the right side. He has had worsening posture, and left > right LE pain that stops at the ankle. Has exhausted non operative interventions per , and has seen  as well.\par His right LE injury is a confounding factor here, where his knee is extended with spasm/fibrosis in his Quadriceps it seems.   I do not believe his thoracic discs are symptomatic at this point. \par \par We discussed a limited approach in the form of a left side L3-4/L4-5 foramenotomy versus a more inclusive correction of his deformity with PI-LL mismatch of 24 degrees. This would include L5-S1 ALIF, and T10-Pelvis. Would need CT T/L Spine and DEXA scan to help plan this. He is unsure which route he wants to go at this point, but will get the imaging and see us back to discuss further. \par \par I have spent 40 minutes in this encounter.\par \par Pérez Bradford MD

## 2022-11-02 NOTE — DATA REVIEWED
[de-identified] : MRI of the thoracic and lumbar spine spine without contrast at [facility] done on 08/15/2022 and 08/11/2022 respectively.

## 2022-11-02 NOTE — ASSESSMENT
[FreeTextEntry1] : IMPRESSION:\par History of prior C4/7 ACDF with Dr. Greenberg 2012 as well as L2/3 lumbar fusion by another provider in 2009.\par Fairly new lower back pain with radiation to b/l LEs into thighs and lower legs L>R for the past one year\par On recent imaging studies, he has evidence of thoracic and lumbar stenosis and his pain has not been relieved with conservative measures. Imaging studies including MRI thoracic, lumbar spine, scoliosis films and x-ray flex/ext reviewed with patient in details. \par \par \par PLAN:\par - We had a long discussion with patient regarding, surgical intervention discussed in great detail with benefits and risks as well as alternative to the proposed treatment was also provided to the patient. Based on recent scoliosis films her spine curvature is off by 24 degrees. He would benefit from L5/S1 ALIF, T10-Pelvis fusion. We will obtain CT thoracic and lumbar spine as well as DEXA scan to evaluated for bone quality. At the mean time, he will think about surgery, discuss it with his family and will let us know on his follow up visit in few weeks. He was given the opportunity to have all his questions answered to his satisfaction.\par \par \par \par \par \par \par  \par \par \par

## 2022-11-02 NOTE — PHYSICAL EXAM
[General Appearance - Alert] : alert [General Appearance - In No Acute Distress] : in no acute distress [Oriented To Time, Place, And Person] : oriented to person, place, and time [Impaired Insight] : insight and judgment were intact [Affect] : the affect was normal [Mood] : the mood was normal [Person] : oriented to person [Place] : oriented to place [Time] : oriented to time [Cranial Nerves Optic (II)] : visual acuity intact bilaterally,  pupils equal round and reactive to light [Cranial Nerves Oculomotor (III)] : extraocular motion intact [Cranial Nerves Trigeminal (V)] : facial sensation intact symmetrically [Cranial Nerves Facial (VII)] : face symmetrical [Cranial Nerves Vestibulocochlear (VIII)] : hearing was intact bilaterally [Cranial Nerves Glossopharyngeal (IX)] : tongue and palate midline [Cranial Nerves Accessory (XI - Cranial And Spinal)] : head turning and shoulder shrug symmetric [Cranial Nerves Hypoglossal (XII)] : there was no tongue deviation with protrusion [Sensation Tactile Decrease] : light touch was intact [Balance] : balance was intact [0] : Ankle jerk right 0 [2+] : Ankle jerk left 2+ [Sclera] : the sclera and conjunctiva were normal [Extraocular Movements] : extraocular movements were intact [Outer Ear] : the ears and nose were normal in appearance [Hearing Threshold Finger Rub Not [place]] : hearing was normal [Neck Appearance] : the appearance of the neck was normal [] : no respiratory distress [Exaggerated Use Of Accessory Muscles For Inspiration] : no accessory muscle use [No Spinal Tenderness] : no spinal tenderness [Abnormal Walk] : normal gait [Skin Color & Pigmentation] : normal skin color and pigmentation [FreeTextEntry5] : Baseline RLE weakness 2/2 to injury x 40 years otherwise normal strength in all other limbs  [FreeTextEntry8] : using a cane

## 2022-11-02 NOTE — HISTORY OF PRESENT ILLNESS
[FreeTextEntry1] : lower back pain with radiation to bilateral lower extremities  [de-identified] : Mr. Day  is a 67 year old gentleman with PMH of HTN, gastritis who underwent a C4-C5, C5-C6, and C6-C7 anterior discectomy and interbody fusion on 04/26/2012 with \par Dr. Greenberg. He also had an L2-L3 lumbar fusion at the [facility] by another provider in 2009. He had been doing well but recently has been experiencing new lower back pain radiating to bilateral lower extremities into his thighs and lower legs for the past one year left worse than the right side. Pain is on the lateral aspect going all the way to ankles. Pain is worse with walking and sitting for prolonged period of time. He can not walk more than one block and has to stop and rest. \par \par He has been doing physical therapy and well as epidural injections x 3 with pain management Dr. Tim Yeager without significant relief. No weakness, numbness, bladder or bowel dysfunction.  Patient was seen with Dr. Greenberg and it was discussed discussed that if he wanted to pursue a surgical intervention, he would need an extensive surgery which would involve decompression and fusion. Imaging studies including MRI thoracic, lumbar spine, scoliosis films and x-ray flex/ext completed and on PACs reviewed with patient. Has RLE baseline weakness 2/2 injury and surgery in the past. Per patient, the weakness has been there for ~ 40 years now, unable to bend his right knee. Uses a cane to ambulate. \par \par \par \par \par

## 2022-11-04 ENCOUNTER — APPOINTMENT (OUTPATIENT)
Dept: CT IMAGING | Facility: IMAGING CENTER | Age: 67
End: 2022-11-04

## 2022-11-04 ENCOUNTER — OUTPATIENT (OUTPATIENT)
Dept: OUTPATIENT SERVICES | Facility: HOSPITAL | Age: 67
LOS: 1 days | End: 2022-11-04
Payer: MEDICARE

## 2022-11-04 ENCOUNTER — APPOINTMENT (OUTPATIENT)
Dept: RADIOLOGY | Facility: CLINIC | Age: 67
End: 2022-11-04

## 2022-11-04 ENCOUNTER — APPOINTMENT (OUTPATIENT)
Dept: RADIOLOGY | Facility: IMAGING CENTER | Age: 67
End: 2022-11-04

## 2022-11-04 DIAGNOSIS — Z98.89 OTHER SPECIFIED POSTPROCEDURAL STATES: Chronic | ICD-10-CM

## 2022-11-04 DIAGNOSIS — M54.6 PAIN IN THORACIC SPINE: ICD-10-CM

## 2022-11-04 DIAGNOSIS — Z87.19 PERSONAL HISTORY OF OTHER DISEASES OF THE DIGESTIVE SYSTEM: Chronic | ICD-10-CM

## 2022-11-04 DIAGNOSIS — M48.062 SPINAL STENOSIS, LUMBAR REGION WITH NEUROGENIC CLAUDICATION: ICD-10-CM

## 2022-11-04 DIAGNOSIS — Z98.1 ARTHRODESIS STATUS: Chronic | ICD-10-CM

## 2022-11-04 DIAGNOSIS — Z95.820 PERIPHERAL VASCULAR ANGIOPLASTY STATUS WITH IMPLANTS AND GRAFTS: Chronic | ICD-10-CM

## 2022-11-04 PROCEDURE — 77080 DXA BONE DENSITY AXIAL: CPT

## 2022-11-04 PROCEDURE — 77080 DXA BONE DENSITY AXIAL: CPT | Mod: 26

## 2022-11-04 PROCEDURE — 72128 CT CHEST SPINE W/O DYE: CPT

## 2022-11-04 PROCEDURE — 72128 CT CHEST SPINE W/O DYE: CPT | Mod: 26,MH

## 2022-11-04 PROCEDURE — 72131 CT LUMBAR SPINE W/O DYE: CPT

## 2022-11-04 PROCEDURE — 72131 CT LUMBAR SPINE W/O DYE: CPT | Mod: 26,MH

## 2022-11-14 ENCOUNTER — APPOINTMENT (OUTPATIENT)
Dept: NEUROSURGERY | Facility: CLINIC | Age: 67
End: 2022-11-14

## 2022-11-14 VITALS
DIASTOLIC BLOOD PRESSURE: 79 MMHG | TEMPERATURE: 97.5 F | BODY MASS INDEX: 32.96 KG/M2 | HEIGHT: 67 IN | SYSTOLIC BLOOD PRESSURE: 134 MMHG | OXYGEN SATURATION: 97 % | HEART RATE: 74 BPM | WEIGHT: 210 LBS

## 2022-11-14 DIAGNOSIS — M48.062 SPINAL STENOSIS, LUMBAR REGION WITH NEUROGENIC CLAUDICATION: ICD-10-CM

## 2022-11-14 DIAGNOSIS — M48.04 SPINAL STENOSIS, THORACIC REGION: ICD-10-CM

## 2022-11-14 DIAGNOSIS — M54.16 RADICULOPATHY, LUMBAR REGION: ICD-10-CM

## 2022-11-14 PROCEDURE — 99214 OFFICE O/P EST MOD 30 MIN: CPT

## 2022-11-18 NOTE — RESULTS/DATA
[FreeTextEntry1] : MRI Thoracic spine:  [id number] is a moderate sized right paracentral to the right foraminal zone dis protrusion which results in moderate overall central canal stenosis with mass effect on the ventral aspect of the right hemicord without abnormal cord signal.\par \par MRI lumbar spine:  There is evidence of the L2-L3 instrumented and interbody fusion.  There is stenosis with central and left lateral recess and foraminal stenosis at L3-L4 and on the right lateral recess and foraminal stenosis at L4-L5 with facet hypertrophy at each of those levels.

## 2022-11-18 NOTE — ASSESSMENT
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 He has had worsening posture, and left > right LE pain that stops at the ankle. Has exhausted non operative interventions per , and has seen  as well. His right LE injury is a confounding factor here, where his knee is extended with spasm/fibrosis in his Quadriceps it seems.   I do not believe his thoracic discs are symptomatic at this point. \par \par We discussed a limited approach in the form of a left side L3-4/L4-5 foramenotomy versus a more inclusive correction of his deformity with PI-LL mismatch of 24 degrees. This would include L5-S1 ALIF, and T10-Pelvis. \par CT T/L spine and DEXA scans completed, intact HW, normal bone density. Patient is leaning more towards L5-S1 ALIF, and T10-Pelvis fusion. Will have patient see vascular surgery Dr. Scott Carvajal before making his final decision. \par \par \par \par \par \par \par \par \par \par \par \par \par \par \par \par \par \par \par  \par \par \par

## 2022-11-18 NOTE — HISTORY OF PRESENT ILLNESS
[FreeTextEntry1] : Mr. Day  is a 67 year old gentleman with PMH of HTN, gastritis who underwent a C4-C5, C5-C6, and C6-C7 anterior discectomy and interbody fusion on 04/26/2012 with Dr. Greenberg. He also had an L2-L3 lumbar fusion at the [facility] by another provider in 2009. He had been doing well but recently has been experiencing new lower back pain radiating to bilateral lower extremities into his thighs and lower legs for the past one year left worse than the right side. Pain is on the lateral aspect going all the way to ankles. Pain is worse with walking and sitting for prolonged period of time. He can not walk more than one block and has to stop and rest. \par \par He has been doing physical therapy and well as epidural injections x 3 with pain management Dr. Tim Yeager without significant relief. No weakness, numbness, bladder or bowel dysfunction.  Patient was seen with Dr. Greenberg and it was discussed discussed that if he wanted to pursue a surgical intervention, he would need an extensive surgery which would involve decompression and fusion. Imaging studies including MRI thoracic, lumbar spine, scoliosis films and x-ray flex/ext completed and on PACs reviewed with patient. Has RLE baseline weakness 2/2 injury and surgery in the past. Per patient, the weakness has been there for ~ 40 years now, unable to bend his right knee. Uses a cane to ambulate. \par \par \par \par \par \par  [de-identified] : \par \par \par \par

## 2022-11-18 NOTE — ADDENDUM
[FreeTextEntry1] : I have personally seen , and reviewed his imaging with him and his wife on PACS.\par We reviewed his lumbar and thoracic spine pathology along with matured fusion at L2-3. We did discuss a focused decompression on the left L5 nerve root on the left versus a more comprehensive corrective procedure entailing T10-Pelvis fusion / L5-S1 ALIF. We discussed the risks and potential outcomes, and limitations post op with such a fusion, including loss of ROM. He has a spastic right LE with fixed knee extension, which is limiting at baseline thus, he is concerned about any further loss of ability to bend. Suggested trialling a TLSO to simulate how a fusion would feel like. He would like to meet with  to discuss the risks of ALIF before making a decision re type of surgery he wants. \par \par I have spent 30 minutes in this encounter.\par \par Pérez Bradford MD

## 2022-11-18 NOTE — DATA REVIEWED
[de-identified] : MRI of the thoracic and lumbar spine spine without contrast at [facility] done on 08/15/2022 and 08/11/2022 respectively.

## 2022-11-18 NOTE — PHYSICAL EXAM
[General Appearance - Alert] : alert [General Appearance - In No Acute Distress] : in no acute distress [Oriented To Time, Place, And Person] : oriented to person, place, and time [Impaired Insight] : insight and judgment were intact [Affect] : the affect was normal [Mood] : the mood was normal [Person] : oriented to person [Place] : oriented to place [Time] : oriented to time [Cranial Nerves Optic (II)] : visual acuity intact bilaterally,  pupils equal round and reactive to light [Cranial Nerves Oculomotor (III)] : extraocular motion intact [Cranial Nerves Trigeminal (V)] : facial sensation intact symmetrically [Cranial Nerves Facial (VII)] : face symmetrical [Cranial Nerves Vestibulocochlear (VIII)] : hearing was intact bilaterally [Cranial Nerves Glossopharyngeal (IX)] : tongue and palate midline [Cranial Nerves Accessory (XI - Cranial And Spinal)] : head turning and shoulder shrug symmetric [Cranial Nerves Hypoglossal (XII)] : there was no tongue deviation with protrusion [Sensation Tactile Decrease] : light touch was intact [Balance] : balance was intact [0] : Ankle jerk right 0 [2+] : Ankle jerk left 2+ [Sclera] : the sclera and conjunctiva were normal [Extraocular Movements] : extraocular movements were intact [Outer Ear] : the ears and nose were normal in appearance [Hearing Threshold Finger Rub Not [place]] : hearing was normal [Neck Appearance] : the appearance of the neck was normal [] : no respiratory distress [Exaggerated Use Of Accessory Muscles For Inspiration] : no accessory muscle use [No Spinal Tenderness] : no spinal tenderness [Abnormal Walk] : normal gait [Skin Color & Pigmentation] : normal skin color and pigmentation [FreeTextEntry5] : Baseline RLE weakness 2/2 to injury x 40 years otherwise normal strength in all other limbs  [FreeTextEntry8] : using a cane

## 2022-12-09 ENCOUNTER — APPOINTMENT (OUTPATIENT)
Dept: VASCULAR SURGERY | Facility: CLINIC | Age: 67
End: 2022-12-09

## 2023-02-10 ENCOUNTER — APPOINTMENT (OUTPATIENT)
Dept: VASCULAR SURGERY | Facility: CLINIC | Age: 68
End: 2023-02-10

## 2023-06-02 NOTE — PATIENT PROFILE ADULT - NSPROGENDIFFINTUB_GEN_A_NUR
When discharged, take only medications prescribed as instructed by your hospital provider    Do not stop or change any medications until you discuss changes with your own prescriber    Do not take any other medications, including left over medications from before your admission, over the counter medications or herbal supplements, unless you discuss with your own provider
previously intubated - no problems

## 2023-08-01 ENCOUNTER — OUTPATIENT (OUTPATIENT)
Dept: OUTPATIENT SERVICES | Facility: HOSPITAL | Age: 68
LOS: 1 days | End: 2023-08-01
Payer: MEDICARE

## 2023-08-01 ENCOUNTER — APPOINTMENT (OUTPATIENT)
Dept: ANESTHESIOLOGY | Facility: CLINIC | Age: 68
End: 2023-08-01

## 2023-08-01 DIAGNOSIS — Z87.19 PERSONAL HISTORY OF OTHER DISEASES OF THE DIGESTIVE SYSTEM: Chronic | ICD-10-CM

## 2023-08-01 DIAGNOSIS — Z98.1 ARTHRODESIS STATUS: Chronic | ICD-10-CM

## 2023-08-01 DIAGNOSIS — M54.16 RADICULOPATHY, LUMBAR REGION: ICD-10-CM

## 2023-08-01 DIAGNOSIS — Z95.820 PERIPHERAL VASCULAR ANGIOPLASTY STATUS WITH IMPLANTS AND GRAFTS: Chronic | ICD-10-CM

## 2023-08-01 DIAGNOSIS — M54.17 RADICULOPATHY, LUMBOSACRAL REGION: ICD-10-CM

## 2023-08-01 DIAGNOSIS — Z98.89 OTHER SPECIFIED POSTPROCEDURAL STATES: Chronic | ICD-10-CM

## 2023-08-01 PROCEDURE — 64484 NJX AA&/STRD TFRM EPI L/S EA: CPT

## 2023-08-01 PROCEDURE — 64483 NJX AA&/STRD TFRM EPI L/S 1: CPT

## 2024-09-11 NOTE — PATIENT PROFILE ADULT - FALL HARM RISK
Continue Regimen: Clobetasol PRN Detail Level: Simple bones(Osteoporosis,prev fx,steroid use,metastatic bone ca)/other

## 2024-10-30 NOTE — PROGRESS NOTE ADULT - PROBLEM/PLAN-2
SURGERY DAILY PROGRESS NOTE:       SUBJECTIVE/ROS: Patient seen and evaluated on AM rounds.   Denies nausea, vomiting, chest pain, shortness of breath       OBJECTIVE:    Vital Signs Last 24 Hrs  T(C): 36.7 (30 Oct 2024 05:26), Max: 37.1 (29 Oct 2024 07:15)  T(F): 98 (30 Oct 2024 05:26), Max: 98.7 (29 Oct 2024 07:15)  HR: 62 (30 Oct 2024 05:26) (62 - 96)  BP: 133/78 (30 Oct 2024 05:26) (110/69 - 158/86)  BP(mean): --  RR: 18 (30 Oct 2024 05:26) (16 - 18)  SpO2: 98% (30 Oct 2024 05:26) (97% - 100%)    Parameters below as of 30 Oct 2024 05:26  Patient On (Oxygen Delivery Method): room air      I&O's Detail    29 Oct 2024 07:01  -  30 Oct 2024 06:37  --------------------------------------------------------  IN:    IV PiggyBack: 100 mL    IV PiggyBack: 200 mL    Lactated Ringers: 650 mL    Lactated Ringers: 650 mL    Oral Fluid: 120 mL  Total IN: 1720 mL    OUT:    Drain (mL): 95 mL    Drain (mL): 5 mL    Voided (mL): 925 mL  Total OUT: 1025 mL    Total NET: 695 mL        Daily     Daily   MEDICATIONS  (STANDING):  atorvastatin 20 milliGRAM(s) Oral at bedtime  dextrose 5%. 1000 milliLiter(s) (50 mL/Hr) IV Continuous <Continuous>  dextrose 5%. 1000 milliLiter(s) (100 mL/Hr) IV Continuous <Continuous>  dextrose 50% Injectable 12.5 Gram(s) IV Push once  dextrose 50% Injectable 25 Gram(s) IV Push once  dextrose 50% Injectable 25 Gram(s) IV Push once  ferrous    sulfate 325 milliGRAM(s) Oral <User Schedule>  glucagon  Injectable 1 milliGRAM(s) IntraMuscular once  heparin   Injectable 5000 Unit(s) SubCutaneous every 8 hours  insulin lispro (ADMELOG) corrective regimen sliding scale   SubCutaneous at bedtime  insulin lispro (ADMELOG) corrective regimen sliding scale   SubCutaneous three times a day before meals  lactated ringers. 1000 milliLiter(s) (50 mL/Hr) IV Continuous <Continuous>  lisinopril 40 milliGRAM(s) Oral daily  metoprolol succinate ER 25 milliGRAM(s) Oral daily  pantoprazole    Tablet 40 milliGRAM(s) Oral before breakfast  piperacillin/tazobactam IVPB.. 3.375 Gram(s) IV Intermittent every 8 hours    MEDICATIONS  (PRN):  dextrose Oral Gel 15 Gram(s) Oral once PRN Blood Glucose LESS THAN 70 milliGRAM(s)/deciliter  oxyCODONE    IR 2.5 milliGRAM(s) Oral every 4 hours PRN Moderate Pain (4 - 6)  oxyCODONE    IR 5 milliGRAM(s) Oral every 6 hours PRN Severe Pain (7 - 10)      LABS:                        8.2    5.18  )-----------( 170      ( 30 Oct 2024 00:30 )             25.2     10-30    139  |  104  |  8   ----------------------------<  160[H]  3.7   |  22  |  0.67    Ca    8.9      30 Oct 2024 00:30  Phos  3.2     10-30  Mg     2.3     10-30    TPro  7.9  /  Alb  4.1  /  TBili  0.9  /  DBili  x   /  AST  32  /  ALT  19  /  AlkPhos  144[H]  10-28    PT/INR - ( 28 Oct 2024 20:24 )   PT: 14.7 sec;   INR: 1.29 ratio         PTT - ( 28 Oct 2024 20:24 )  PTT:29.4 sec  Urinalysis Basic - ( 30 Oct 2024 00:30 )    Color: x / Appearance: x / SG: x / pH: x  Gluc: 160 mg/dL / Ketone: x  / Bili: x / Urobili: x   Blood: x / Protein: x / Nitrite: x   Leuk Esterase: x / RBC: x / WBC x   Sq Epi: x / Non Sq Epi: x / Bacteria: x                PHYSICAL EXAM:  General: NAD, resting comfortably  Pulmonary: non labored breathing   Abdominal: soft, tender to palpation in right lower quadrant. no distended. right malecot and ISAIAS drains in place and draining             SURGERY DAILY PROGRESS NOTE:       SUBJECTIVE/ROS: Patient seen and evaluated on AM rounds.  Tolerating clears.  Denies nausea, vomiting, chest pain, shortness of breath       OBJECTIVE:    Vital Signs Last 24 Hrs  T(C): 36.7 (30 Oct 2024 05:26), Max: 37.1 (29 Oct 2024 07:15)  T(F): 98 (30 Oct 2024 05:26), Max: 98.7 (29 Oct 2024 07:15)  HR: 62 (30 Oct 2024 05:26) (62 - 96)  BP: 133/78 (30 Oct 2024 05:26) (110/69 - 158/86)  BP(mean): --  RR: 18 (30 Oct 2024 05:26) (16 - 18)  SpO2: 98% (30 Oct 2024 05:26) (97% - 100%)    Parameters below as of 30 Oct 2024 05:26  Patient On (Oxygen Delivery Method): room air      I&O's Detail    29 Oct 2024 07:01  -  30 Oct 2024 06:37  --------------------------------------------------------  IN:    IV PiggyBack: 100 mL    IV PiggyBack: 200 mL    Lactated Ringers: 650 mL    Lactated Ringers: 650 mL    Oral Fluid: 120 mL  Total IN: 1720 mL    OUT:    Drain (mL): 95 mL    Drain (mL): 5 mL    Voided (mL): 925 mL  Total OUT: 1025 mL    Total NET: 695 mL        Daily     Daily   MEDICATIONS  (STANDING):  atorvastatin 20 milliGRAM(s) Oral at bedtime  dextrose 5%. 1000 milliLiter(s) (50 mL/Hr) IV Continuous <Continuous>  dextrose 5%. 1000 milliLiter(s) (100 mL/Hr) IV Continuous <Continuous>  dextrose 50% Injectable 12.5 Gram(s) IV Push once  dextrose 50% Injectable 25 Gram(s) IV Push once  dextrose 50% Injectable 25 Gram(s) IV Push once  ferrous    sulfate 325 milliGRAM(s) Oral <User Schedule>  glucagon  Injectable 1 milliGRAM(s) IntraMuscular once  heparin   Injectable 5000 Unit(s) SubCutaneous every 8 hours  insulin lispro (ADMELOG) corrective regimen sliding scale   SubCutaneous at bedtime  insulin lispro (ADMELOG) corrective regimen sliding scale   SubCutaneous three times a day before meals  lactated ringers. 1000 milliLiter(s) (50 mL/Hr) IV Continuous <Continuous>  lisinopril 40 milliGRAM(s) Oral daily  metoprolol succinate ER 25 milliGRAM(s) Oral daily  pantoprazole    Tablet 40 milliGRAM(s) Oral before breakfast  piperacillin/tazobactam IVPB.. 3.375 Gram(s) IV Intermittent every 8 hours    MEDICATIONS  (PRN):  dextrose Oral Gel 15 Gram(s) Oral once PRN Blood Glucose LESS THAN 70 milliGRAM(s)/deciliter  oxyCODONE    IR 2.5 milliGRAM(s) Oral every 4 hours PRN Moderate Pain (4 - 6)  oxyCODONE    IR 5 milliGRAM(s) Oral every 6 hours PRN Severe Pain (7 - 10)      LABS:                        8.2    5.18  )-----------( 170      ( 30 Oct 2024 00:30 )             25.2     10-30    139  |  104  |  8   ----------------------------<  160[H]  3.7   |  22  |  0.67    Ca    8.9      30 Oct 2024 00:30  Phos  3.2     10-30  Mg     2.3     10-30    TPro  7.9  /  Alb  4.1  /  TBili  0.9  /  DBili  x   /  AST  32  /  ALT  19  /  AlkPhos  144[H]  10-28    PT/INR - ( 28 Oct 2024 20:24 )   PT: 14.7 sec;   INR: 1.29 ratio         PTT - ( 28 Oct 2024 20:24 )  PTT:29.4 sec  Urinalysis Basic - ( 30 Oct 2024 00:30 )    Color: x / Appearance: x / SG: x / pH: x  Gluc: 160 mg/dL / Ketone: x  / Bili: x / Urobili: x   Blood: x / Protein: x / Nitrite: x   Leuk Esterase: x / RBC: x / WBC x   Sq Epi: x / Non Sq Epi: x / Bacteria: x                PHYSICAL EXAM:  General: NAD, resting comfortably  Pulmonary: non labored breathing   Abdominal: soft, tender to palpation in right lower quadrant. no distended. right malecot and ISAIAS drains in place and draining             DISPLAY PLAN FREE TEXT

## 2024-12-27 ENCOUNTER — NON-APPOINTMENT (OUTPATIENT)
Age: 69
End: 2024-12-27

## 2025-01-10 ENCOUNTER — NON-APPOINTMENT (OUTPATIENT)
Age: 70
End: 2025-01-10

## 2025-01-15 ENCOUNTER — APPOINTMENT (OUTPATIENT)
Dept: OTOLARYNGOLOGY | Facility: CLINIC | Age: 70
End: 2025-01-15
Payer: MEDICARE

## 2025-01-15 VITALS
OXYGEN SATURATION: 97 % | WEIGHT: 200 LBS | BODY MASS INDEX: 31.39 KG/M2 | HEIGHT: 67 IN | DIASTOLIC BLOOD PRESSURE: 85 MMHG | SYSTOLIC BLOOD PRESSURE: 144 MMHG | HEART RATE: 71 BPM

## 2025-01-15 DIAGNOSIS — J38.7 OTHER DISEASES OF LARYNX: ICD-10-CM

## 2025-01-15 DIAGNOSIS — R13.12 DYSPHAGIA, OROPHARYNGEAL PHASE: ICD-10-CM

## 2025-01-15 PROCEDURE — 99204 OFFICE O/P NEW MOD 45 MIN: CPT | Mod: 25

## 2025-01-15 PROCEDURE — 31575 DIAGNOSTIC LARYNGOSCOPY: CPT

## 2025-02-04 ENCOUNTER — APPOINTMENT (OUTPATIENT)
Dept: OTOLARYNGOLOGY | Facility: CLINIC | Age: 70
End: 2025-02-04
Payer: MEDICARE

## 2025-02-04 VITALS
WEIGHT: 200 LBS | SYSTOLIC BLOOD PRESSURE: 120 MMHG | DIASTOLIC BLOOD PRESSURE: 81 MMHG | HEART RATE: 60 BPM | OXYGEN SATURATION: 98 % | RESPIRATION RATE: 17 BRPM | HEIGHT: 67 IN | BODY MASS INDEX: 31.39 KG/M2

## 2025-02-04 DIAGNOSIS — R09.A2 FOREIGN BODY SENSATION, THROAT: ICD-10-CM

## 2025-02-04 DIAGNOSIS — R13.12 DYSPHAGIA, OROPHARYNGEAL PHASE: ICD-10-CM

## 2025-02-04 PROCEDURE — 31575 DIAGNOSTIC LARYNGOSCOPY: CPT

## 2025-02-04 PROCEDURE — 99214 OFFICE O/P EST MOD 30 MIN: CPT | Mod: 25

## 2025-02-04 RX ORDER — OMEPRAZOLE 40 MG/1
40 CAPSULE, DELAYED RELEASE ORAL DAILY
Qty: 30 | Refills: 4 | Status: ACTIVE | COMMUNITY
Start: 2025-02-04 | End: 1900-01-01

## 2025-06-03 ENCOUNTER — RX RENEWAL (OUTPATIENT)
Age: 70
End: 2025-06-03